# Patient Record
Sex: FEMALE | Race: OTHER | HISPANIC OR LATINO | URBAN - METROPOLITAN AREA
[De-identification: names, ages, dates, MRNs, and addresses within clinical notes are randomized per-mention and may not be internally consistent; named-entity substitution may affect disease eponyms.]

---

## 2024-03-23 ENCOUNTER — EMERGENCY (EMERGENCY)
Facility: HOSPITAL | Age: 44
LOS: 1 days | Discharge: HOME | End: 2024-03-23
Attending: EMERGENCY MEDICINE
Payer: COMMERCIAL

## 2024-03-23 VITALS
HEART RATE: 80 BPM | OXYGEN SATURATION: 100 % | HEIGHT: 65 IN | SYSTOLIC BLOOD PRESSURE: 148 MMHG | WEIGHT: 145.95 LBS | TEMPERATURE: 99 F | DIASTOLIC BLOOD PRESSURE: 88 MMHG | RESPIRATION RATE: 18 BRPM

## 2024-03-23 PROCEDURE — 99283 EMERGENCY DEPT VISIT LOW MDM: CPT

## 2024-03-23 PROCEDURE — 99284 EMERGENCY DEPT VISIT MOD MDM: CPT

## 2024-03-23 RX ORDER — METOCLOPRAMIDE HCL 10 MG
1 TABLET ORAL
Qty: 21 | Refills: 0
Start: 2024-03-23 | End: 2024-03-29

## 2024-03-23 RX ORDER — DEXAMETHASONE 0.5 MG/5ML
8 ELIXIR ORAL ONCE
Refills: 0 | Status: COMPLETED | OUTPATIENT
Start: 2024-03-23 | End: 2024-03-23

## 2024-03-23 RX ORDER — MECLIZINE HCL 12.5 MG
1 TABLET ORAL
Qty: 21 | Refills: 0
Start: 2024-03-23 | End: 2024-03-29

## 2024-03-23 RX ORDER — METOCLOPRAMIDE HCL 10 MG
10 TABLET ORAL ONCE
Refills: 0 | Status: COMPLETED | OUTPATIENT
Start: 2024-03-23 | End: 2024-03-23

## 2024-03-23 RX ORDER — MECLIZINE HCL 12.5 MG
50 TABLET ORAL ONCE
Refills: 0 | Status: COMPLETED | OUTPATIENT
Start: 2024-03-23 | End: 2024-03-23

## 2024-03-23 RX ADMIN — Medication 50 MILLIGRAM(S): at 16:11

## 2024-03-23 RX ADMIN — Medication 8 MILLIGRAM(S): at 16:11

## 2024-03-23 RX ADMIN — Medication 10 MILLIGRAM(S): at 16:11

## 2024-03-23 NOTE — ED ADULT NURSE NOTE - NSFALLUNIVINTERV_ED_ALL_ED
Bed/Stretcher in lowest position, wheels locked, appropriate side rails in place/Call bell, personal items and telephone in reach/Instruct patient to call for assistance before getting out of bed/chair/stretcher/Non-slip footwear applied when patient is off stretcher/[place] to call system/Physically safe environment - no spills, clutter or unnecessary equipment/Purposeful proactive rounding/Room/bathroom lighting operational, light cord in reach

## 2024-03-23 NOTE — ED PROVIDER NOTE - NSFOLLOWUPCLINICS_GEN_ALL_ED_FT
[facility]  Otolaryngology (ENT)  [address] [place] [address]  Phone: [phone]  Fax: [phone]    [facility]  Otolaryngology (ENT)  [address], [address]  [place] [address]  Phone: [phone]  Fax:     [facility] - ENT  Otolaryngology (ENT)  [address] [place] [address]  Phone: [phone]  Fax:

## 2024-03-23 NOTE — ED PROVIDER NOTE - OBJECTIVE STATEMENT
43 year old female denies PMH coming in with ringing in right ear with associated muffled hearing and room spinning sensation when she tilts her head backwards since last night. never had this before. denies all other complaints at this time.

## 2024-03-23 NOTE — ED ADULT NURSE NOTE - OBJECTIVE STATEMENT
42 yo female sitting on a chair c/o hearing loss and ringing in the right ear that started yesterday.

## 2024-03-23 NOTE — ED PROVIDER NOTE - PATIENT PORTAL LINK FT
You can access the FollowMyHealth Patient Portal offered by [facility] by registering at the following website: [email or web address]. By joining [employer]’s FollowMyHealth portal, you will also be able to view your health information using other applications (apps) compatible with our system.

## 2024-03-23 NOTE — ED PROVIDER NOTE - CARE PROVIDER_API CALL
Rodríguez Mathew  Otolaryngology  [address] [place] [address], [place] [address]  Phone: [phone]  Fax: [phone]  Follow Up Time:

## 2024-03-23 NOTE — ED PROVIDER NOTE - PROGRESS NOTE DETAILS
feels improved. symptoms likely 2/2 labrynthitis given tinnitus and vertigo acutely and no hx. will dc with meclizine and reglan. f/u with ENT. return precautions discussed.

## 2024-03-23 NOTE — ED PROVIDER NOTE - NSFOLLOWUPINSTRUCTIONS_ED_ALL_ED_FT
Tinnitus  Tinnitus refers to hearing a sound when there is no actual source for that sound. This is often described as ringing in the ears. However, people with this condition may hear a variety of noises, in one ear or in both ears.    The sounds of tinnitus can be soft, loud, or somewhere in between. Tinnitus can last for a few seconds or can be constant for days. It may go away without treatment and come back at various times. When tinnitus is constant or happens often, it can lead to other problems, such as trouble sleeping and trouble concentrating.    Almost everyone experiences tinnitus at some point. Tinnitus is not the same as hearing loss. Tinnitus that is long-lasting (chronic) or comes back often (recurs) may require medical attention.    What are the causes?  The cause of tinnitus is often not known. In some cases, it can result from:  Exposure to loud noises from machinery, music, or other sources.  An object (foreign body) stuck in the ear.  Earwax buildup.  Drinking alcohol or caffeine.  Taking certain medicines.  Age-related hearing loss.  It may also be caused by medical conditions such as:  Ear or sinus infections.  Heart diseases or high blood pressure.  Allergies.  Ménière's disease.  Thyroid problems.  Tumors.  A weak, bulging blood vessel (aneurysm) near the ear.  What increases the risk?  The following factors may make you more likely to develop this condition:  Exposure to loud noises.  Age. Tinnitus is more likely in older individuals.  Using alcohol or tobacco.  What are the signs or symptoms?  The main symptom of tinnitus is hearing a sound when there is no source for that sound. It may sound like:  Buzzing.  Sizzling.  Ringing.  Blowing air.  Hissing.  Whistling.  Other sounds may include:  Roaring.  Running water.  A musical note.  Tapping.  Humming.  Symptoms may affect only one ear (unilateral) or both ears (bilateral).    How is this diagnosed?  Tinnitus is diagnosed based on your symptoms, your medical history, and a physical exam. Your health care provider may do a thorough hearing test (audiologic exam) if your tinnitus:  Is unilateral.  Causes hearing difficulties.  Lasts 6 months or longer.  You may work with a health care provider who specializes in hearing disorders (audiologist). You may be asked questions about your symptoms and how they affect your daily life. You may have other tests done, such as:  CT scan.  MRI.  An imaging test of how blood flows through your blood vessels (angiogram).  How is this treated?  Treating an underlying medical condition can sometimes make tinnitus go away. If your tinnitus continues, other treatments may include:  Therapy and counseling to help you manage the stress of living with tinnitus.  Sound generators to mask the tinnitus. These include:  Tabletop sound machines that play relaxing sounds to help you fall asleep.  Wearable devices that fit in your ear and play sounds or music.  Acoustic neural stimulation. This involves using headphones to listen to music that contains an auditory signal. Over time, listening to this signal may change some pathways in your brain and make you less sensitive to tinnitus. This treatment is used for very severe cases when no other treatment is working.  Using hearing aids or cochlear implants if your tinnitus is related to hearing loss. Hearing aids are worn in the outer ear. Cochlear implants are surgically placed in the inner ear.  Follow these instructions at home:  Managing symptoms    Outline of person in bed with head of bed raised.  Person wearing headphones.  When possible, avoid being in loud places and being exposed to loud sounds.  Wear hearing protection, such as earplugs, when you are exposed to loud noises.  Use a white noise machine, a humidifier, or other devices to mask the sound of tinnitus.  Practice techniques for reducing stress, such as meditation, yoga, or deep breathing. Work with your health care provider if you need help with managing stress.  Sleep with your head slightly raised. This may reduce the impact of tinnitus.  General instructions    Do not use stimulants, such as nicotine, alcohol, or caffeine. Talk with your health care provider about other stimulants to avoid. Stimulants are substances that can make you feel alert and attentive by increasing certain activities in the body (such as heart rate and blood pressure). These substances may make tinnitus worse.  Take over-the-counter and prescription medicines only as told by your health care provider.  Try to get plenty of sleep each night.  Keep all follow-up visits. This is important.  Contact a health care provider if:  Your tinnitus continues for 3 weeks or longer without stopping.  You develop sudden hearing loss.  Your symptoms get worse or do not get better with home care.  You feel you are not able to manage the stress of living with tinnitus.  Get help right away if:  You develop tinnitus after a head injury.  You have tinnitus along with any of the following:  Dizziness.  Nausea and vomiting.  Loss of balance.  Sudden, severe headache.  Vision changes.  Facial weakness or weakness of arms or legs.  These symptoms may represent a serious problem that is an emergency. Do not wait to see if the symptoms will go away. Get medical help right away. Call your local emergency services (911 in the [place] Do not drive yourself to the hospital.    Summary  Tinnitus refers to hearing a sound when there is no actual source for that sound. This is often described as ringing in the ears.  Symptoms may affect only one ear (unilateral) or both ears (bilateral).  Use a white noise machine, a humidifier, or other devices to mask the sound of tinnitus.  Do not use stimulants, such as nicotine, alcohol, or caffeine. These substances may make tinnitus worse.  This information is not intended to replace advice given to you by your health care provider. Make sure you discuss any questions you have with your health care provider.    Document Revised: 11/22/2021 Document Reviewed: 11/22/2021  [employer] Patient Education © 2024 [employer]  [employer] logo  Terms and Conditions  Privacy Policy  Editorial Policy  All content on this site: Copyright © 2024 [employer], its licensors, and contributors. All rights are reserved, including those for text and data mining, AI training, and similar technologies. For all open access content, the Creative Commons licensing terms apply.  Cookies are used by this site. To decline or learn more, visit our Cookies pag

## 2024-03-23 NOTE — ED PROVIDER NOTE - CLINICAL SUMMARY MEDICAL DECISION MAKING FREE TEXT BOX
43 year old female with tinnitus and room spinning. PE as above.  meclizine, reglan, decadron, reassess

## 2024-03-25 ENCOUNTER — APPOINTMENT (OUTPATIENT)
Dept: OTOLARYNGOLOGY | Facility: CLINIC | Age: 44
End: 2024-03-25
Payer: COMMERCIAL

## 2024-03-25 VITALS
HEART RATE: 82 BPM | WEIGHT: 147 LBS | DIASTOLIC BLOOD PRESSURE: 90 MMHG | HEIGHT: 65 IN | OXYGEN SATURATION: 100 % | BODY MASS INDEX: 24.49 KG/M2 | SYSTOLIC BLOOD PRESSURE: 147 MMHG

## 2024-03-25 DIAGNOSIS — Z82.49 FAMILY HISTORY OF ISCHEMIC HEART DISEASE AND OTHER DISEASES OF THE CIRCULATORY SYSTEM: ICD-10-CM

## 2024-03-25 DIAGNOSIS — Z78.9 OTHER SPECIFIED HEALTH STATUS: ICD-10-CM

## 2024-03-25 PROBLEM — Z00.00 ENCOUNTER FOR PREVENTIVE HEALTH EXAMINATION: Status: ACTIVE | Noted: 2024-03-25

## 2024-03-25 PROCEDURE — 92567 TYMPANOMETRY: CPT

## 2024-03-25 PROCEDURE — 99204 OFFICE O/P NEW MOD 45 MIN: CPT | Mod: 25

## 2024-03-25 PROCEDURE — 92557 COMPREHENSIVE HEARING TEST: CPT

## 2024-03-25 RX ORDER — PREDNISONE 10 MG/1
10 TABLET ORAL
Qty: 65 | Refills: 0 | Status: ACTIVE | COMMUNITY
Start: 2024-03-25 | End: 1900-01-01

## 2024-03-25 NOTE — CONSULT LETTER
[Dear  ___] : Dear  [unfilled], [Consult Letter:] : I had the pleasure of evaluating your patient, [unfilled]. [Please see my note below.] : Please see my note below. [Consult Closing:] : Thank you very much for allowing me to participate in the care of this patient.  If you have any questions, please do not hesitate to contact me. [Sincerely,] : Sincerely, [FreeTextEntry2] : Jayde Jenkins [FreeTextEntry3] : Nicki Hobbs MD Otolaryngology and Cranial Base Surgery Attending Physician - Department of Otolaryngology and Head & Neck Surgery [facility]  [employer] [facility]

## 2024-03-25 NOTE — REASON FOR VISIT
[Initial Consultation] : an initial consultation for [FreeTextEntry2] : dizzy and ringing [Source: ______] : History obtained from [unfilled]

## 2024-03-25 NOTE — DATA REVIEWED
[de-identified] : right mild to severe SNHL with discrim at 84 (normal hearing with discrim at 100 on left)

## 2024-03-25 NOTE — ASSESSMENT
Patient notified   See Office note 2/12/2024     [FreeTextEntry1] : vertigo, asymmetric sudden SNHL, right tinnitus, vertical nystagmus: - will check MRI IAC with and without contrast since asymmetric HL, vertigo and nystagmus on [place] Hallpike appeared vertical beating - start high dose prednisone taper; discussed r/b/a - cont meclizine for vertigo - f/u in 2 weeks

## 2024-03-25 NOTE — PHYSICAL EXAM
[Normal] : mucosa is normal [Midline] : trachea located in midline position [] : [place]-Hallpike test is positive [de-identified] : vertical beating nystagmus on right, negative on left

## 2024-03-26 ENCOUNTER — NON-APPOINTMENT (OUTPATIENT)
Age: 44
End: 2024-03-26

## 2024-04-02 ENCOUNTER — NON-APPOINTMENT (OUTPATIENT)
Age: 44
End: 2024-04-02

## 2024-04-03 ENCOUNTER — APPOINTMENT (OUTPATIENT)
Dept: MRI IMAGING | Facility: CLINIC | Age: 44
End: 2024-04-03
Payer: COMMERCIAL

## 2024-04-03 PROCEDURE — 70553 MRI BRAIN STEM W/O & W/DYE: CPT

## 2024-04-03 PROCEDURE — A9585: CPT

## 2024-04-12 ENCOUNTER — APPOINTMENT (OUTPATIENT)
Dept: OTOLARYNGOLOGY | Facility: CLINIC | Age: 44
End: 2024-04-12
Payer: COMMERCIAL

## 2024-04-12 DIAGNOSIS — H91.20 SUDDEN IDIOPATHIC HEARING LOSS, UNSPECIFIED EAR: ICD-10-CM

## 2024-04-12 DIAGNOSIS — Z87.898 PERSONAL HISTORY OF OTHER SPECIFIED CONDITIONS: ICD-10-CM

## 2024-04-12 PROCEDURE — 92557 COMPREHENSIVE HEARING TEST: CPT

## 2024-04-12 PROCEDURE — 99213 OFFICE O/P EST LOW 20 MIN: CPT | Mod: 25

## 2024-04-12 PROCEDURE — 92567 TYMPANOMETRY: CPT

## 2024-04-12 NOTE — ASSESSMENT
[FreeTextEntry1] : Right labyrinthitis: - MRI IAC neg - vertigo resolved and hearing is improved but still with significant HF SNHL on right - reassured that anxiety may improve off the steroids since they can cause jitteriness - complete steroid taper and f/u in 1 month to recheck - can consider HA if loss persists

## 2024-04-12 NOTE — REASON FOR VISIT
[Subsequent Evaluation] : a subsequent evaluation for [Source: ______] : History obtained from [unfilled] [FreeTextEntry2] : right labyrinthitis

## 2024-04-12 NOTE — HISTORY OF PRESENT ILLNESS
[de-identified] : 44yo female with right labyrinthitis. She was started on high dose steroid taper - tomorrow is the last day. She notes the vertigo has resolved. However she still has tinnitus in the right ear that it sometimes louder or softer and it is causing anxiety.

## 2024-05-17 ENCOUNTER — APPOINTMENT (OUTPATIENT)
Dept: OTOLARYNGOLOGY | Facility: CLINIC | Age: 44
End: 2024-05-17
Payer: COMMERCIAL

## 2024-05-17 VITALS
HEART RATE: 85 BPM | DIASTOLIC BLOOD PRESSURE: 81 MMHG | BODY MASS INDEX: 24.49 KG/M2 | SYSTOLIC BLOOD PRESSURE: 125 MMHG | HEIGHT: 65 IN | WEIGHT: 147 LBS | TEMPERATURE: 98.1 F

## 2024-05-17 DIAGNOSIS — H90.3 SENSORINEURAL HEARING LOSS, BILATERAL: ICD-10-CM

## 2024-05-17 DIAGNOSIS — H83.01 LABYRINTHITIS, RIGHT EAR: ICD-10-CM

## 2024-05-17 DIAGNOSIS — H93.11 TINNITUS, RIGHT EAR: ICD-10-CM

## 2024-05-17 PROCEDURE — 92567 TYMPANOMETRY: CPT

## 2024-05-17 PROCEDURE — 92557 COMPREHENSIVE HEARING TEST: CPT

## 2024-05-17 PROCEDURE — 99213 OFFICE O/P EST LOW 20 MIN: CPT | Mod: 25

## 2024-05-17 PROCEDURE — G2211 COMPLEX E/M VISIT ADD ON: CPT | Mod: NC,1L

## 2024-05-17 RX ORDER — MECLIZINE HYDROCHLORIDE 25 MG/1
25 TABLET ORAL
Refills: 0 | Status: ACTIVE | COMMUNITY

## 2024-05-17 RX ORDER — METOCLOPRAMIDE 10 MG/1
10 TABLET ORAL
Refills: 0 | Status: ACTIVE | COMMUNITY

## 2024-05-17 NOTE — ASSESSMENT
[FreeTextEntry1] : Right labyrinthitis with resolved vertigo and persistent HF right SNHL s/p steroid taper: - MRI IAC neg - vertigo resolved and hearing is improved but still with significant HF SNHL on right and she has tinnitus so consider HAE - f/u 6 months to recheck

## 2024-05-17 NOTE — END OF VISIT
[FreeTextEntry3] : I personally saw and examined Ms. OLIVA GALVAN in detail this visit today. I personally reviewed the HPI, PMH, FH, SH, ROS and medications/allergies. I have spoken to MICHAEL Starkey regarding the history and have personally determined the assessment and plan of care, and documented this myself. I was present and participated in all key portions of the encounter and all procedures noted above. I have made changes in the body of the note where appropriate.   Attesting Faculty: See Attending Signature Below

## 2024-05-17 NOTE — HISTORY OF PRESENT ILLNESS
[de-identified] : 44yo female with right labyrinthitis. Today she comes in for her 1 month f/u she still continues to have ringing in the right ear that is happening every day. She is no longer having dizzy spells. Her hearing is the same. She denies any ear pain or drainage. No other ENT complaints

## 2024-11-08 ENCOUNTER — APPOINTMENT (OUTPATIENT)
Dept: OTOLARYNGOLOGY | Facility: CLINIC | Age: 44
End: 2024-11-08